# Patient Record
Sex: MALE | Race: WHITE | NOT HISPANIC OR LATINO | Employment: FULL TIME | URBAN - METROPOLITAN AREA
[De-identification: names, ages, dates, MRNs, and addresses within clinical notes are randomized per-mention and may not be internally consistent; named-entity substitution may affect disease eponyms.]

---

## 2019-03-16 ENCOUNTER — OFFICE VISIT (OUTPATIENT)
Dept: URGENT CARE | Facility: CLINIC | Age: 19
End: 2019-03-16
Payer: COMMERCIAL

## 2019-03-16 VITALS
OXYGEN SATURATION: 99 % | SYSTOLIC BLOOD PRESSURE: 126 MMHG | HEART RATE: 84 BPM | RESPIRATION RATE: 16 BRPM | DIASTOLIC BLOOD PRESSURE: 70 MMHG | TEMPERATURE: 98.5 F

## 2019-03-16 DIAGNOSIS — S39.011A STRAIN OF ABDOMINAL MUSCLE, INITIAL ENCOUNTER: ICD-10-CM

## 2019-03-16 PROCEDURE — 99203 OFFICE O/P NEW LOW 30 MIN: CPT | Performed by: NURSE PRACTITIONER

## 2019-03-16 ASSESSMENT — ENCOUNTER SYMPTOMS
CHILLS: 0
FEVER: 0
ABDOMINAL PAIN: 1

## 2019-03-16 NOTE — PROGRESS NOTES
Subjective:      Alvino Mtz is a 18 y.o. male who presents with Back Pain (lower back injury)    History reviewed. No pertinent past medical history.  Social History     Social History   • Marital status: Single     Spouse name: N/A   • Number of children: N/A   • Years of education: N/A     Occupational History   • Not on file.     Social History Main Topics   • Smoking status: Never Smoker   • Smokeless tobacco: Never Used   • Alcohol use No   • Drug use: No   • Sexual activity: Not on file     Other Topics Concern   • Not on file     Social History Narrative   • No narrative on file     History reviewed. No pertinent family history.    Allergies: Sulfa drugs    Patient is a 18-year-old male who presents today with complaint of pain to the right groin area.  Patient states last week he was working out as part of his training regimen for playing baseball.  He plays baseball in college.  Patient states he did not have immediate pain after working out, but that night after he went to sleep turned over on his side and had intense groin pain for a few seconds which then resolved.  He continued trying to play baseball through the week and 2 days ago after playing a game states he had groin pain again.  He was evaluated by the teams  who believed he has a muscle strain, however he did want the patient to be evaluated for possible hernia.  Patient states he is not having pain at this time.          Other   This is a new problem. The problem occurs constantly. The problem has been unchanged. Associated symptoms include abdominal pain. Pertinent negatives include no chills or fever. Nothing aggravates the symptoms. He has tried nothing for the symptoms. The treatment provided no relief.       Review of Systems   Constitutional: Negative for chills, fever and malaise/fatigue.   Gastrointestinal: Positive for abdominal pain.   All other systems reviewed and are negative.         Objective:     Pulse 84   Temp 36.9  °C (98.5 °F) (Temporal)   Resp 16   SpO2 99%      Physical Exam   Constitutional: He is oriented to person, place, and time. He appears well-nourished.   Cardiovascular: Normal rate and regular rhythm.    Pulmonary/Chest: Effort normal and breath sounds normal.   Genitourinary:         Genitourinary Comments: Area of tenderness that was sustained to the right groin area.  The area is nontender at this time.  No swelling noted.  Inguinal canal is nontender, and no obvious hernias palpable.  No pain or swelling noted with Valsalva maneuver.   Neurological: He is alert and oriented to person, place, and time.   Skin: Skin is warm and dry. Capillary refill takes less than 2 seconds.   Psychiatric: He has a normal mood and affect. His behavior is normal. Judgment and thought content normal.   Vitals reviewed.    Ultrasound ordered; patient refused at this time as he is not in acute pain.  States he will return to urgent care if pain returns.          Assessment/Plan:   Right groin strain    Monitor for worsening of symptoms  Return to urgent care immediately for increasing pain or presence of swelling or mass  Ibuprofen  Rest  ER precautions for sustained acute pain  There are no diagnoses linked to this encounter.

## 2022-10-03 ENCOUNTER — APPOINTMENT (OUTPATIENT)
Dept: URGENT CARE | Facility: CLINIC | Age: 22
End: 2022-10-03

## 2022-10-03 ENCOUNTER — OFFICE VISIT (OUTPATIENT)
Dept: URGENT CARE | Facility: CLINIC | Age: 22
End: 2022-10-03
Payer: COMMERCIAL

## 2022-10-03 VITALS
DIASTOLIC BLOOD PRESSURE: 86 MMHG | RESPIRATION RATE: 14 BRPM | SYSTOLIC BLOOD PRESSURE: 134 MMHG | HEIGHT: 71 IN | OXYGEN SATURATION: 98 % | HEART RATE: 79 BPM | WEIGHT: 180 LBS | TEMPERATURE: 97.3 F | BODY MASS INDEX: 25.2 KG/M2

## 2022-10-03 DIAGNOSIS — Q65.89 ACETABULAR RETROVERSION: ICD-10-CM

## 2022-10-03 DIAGNOSIS — R10.31 RIGHT INGUINAL PAIN: ICD-10-CM

## 2022-10-03 PROCEDURE — 99203 OFFICE O/P NEW LOW 30 MIN: CPT | Performed by: FAMILY MEDICINE

## 2022-10-03 NOTE — PROGRESS NOTES
"  Subjective:      22 y.o. male presents to urgent care for groin pain that started about 2.5 weeks ago.  He had been doing dead lifts when he noticed pain, it is intermittent, comes with rest, and improves with stretching, is described as both dull and achy, currently rated 2/10.  He has not yet tried anything for this pain.  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  He has not seen orthopedics since that diagnosis.  He denies any changes to urinary urgency, frequency, dysuria, or hematuria.  Bowel movements remain regular and soft.  No prior history of abdominal surgeries.    He denies any other questions or concerns at this time.    Current problem list, medication, and past medical/surgical history were reviewed in Epic.    ROS  See HPI     Objective:      /86   Pulse 79   Temp 36.3 °C (97.3 °F) (Temporal)   Resp 14   Ht 1.803 m (5' 11\")   Wt 81.6 kg (180 lb)   SpO2 98%   BMI 25.10 kg/m²     Physical Exam  Constitutional:       General: He is not in acute distress.     Appearance: He is not diaphoretic.   Cardiovascular:      Rate and Rhythm: Normal rate and regular rhythm.      Heart sounds: Normal heart sounds.   Pulmonary:      Effort: Pulmonary effort is normal. No respiratory distress.      Breath sounds: Normal breath sounds.   Abdominal:      General: Bowel sounds are normal.      Palpations: Abdomen is soft.      Tenderness: There is no abdominal tenderness. There is no right CVA tenderness or left CVA tenderness.      Hernia: There is no hernia in the left inguinal area or right inguinal area.   Neurological:      Mental Status: He is alert.   Psychiatric:         Mood and Affect: Affect normal.         Judgment: Judgment normal.     Assessment/Plan:     1. Right inguinal pain  2. Acetabular retroversion  Referral back to orthopedics has been placed.  In the meantime, patient was encouraged to continue with stretching, Tylenol, and ibuprofen as needed for " symptomatic relief.  - Referral to Orthopedics      Instructed to return to Urgent Care or nearest Emergency Department if symptoms fail to improve, for any change in condition, further concerns, or new concerning symptoms. Patient states understanding of the plan of care and discharge instructions.    Rosa Paredes M.D.

## 2022-10-13 ENCOUNTER — OFFICE VISIT (OUTPATIENT)
Dept: URGENT CARE | Facility: CLINIC | Age: 22
End: 2022-10-13
Payer: COMMERCIAL

## 2022-10-13 VITALS
SYSTOLIC BLOOD PRESSURE: 118 MMHG | DIASTOLIC BLOOD PRESSURE: 82 MMHG | HEIGHT: 71 IN | WEIGHT: 180 LBS | RESPIRATION RATE: 16 BRPM | BODY MASS INDEX: 25.2 KG/M2 | TEMPERATURE: 98.9 F | OXYGEN SATURATION: 99 % | HEART RATE: 92 BPM

## 2022-10-13 DIAGNOSIS — R10.31 RIGHT INGUINAL PAIN: ICD-10-CM

## 2022-10-13 DIAGNOSIS — K40.90 NON-RECURRENT UNILATERAL INGUINAL HERNIA WITHOUT OBSTRUCTION OR GANGRENE: ICD-10-CM

## 2022-10-13 LAB
APPEARANCE UR: CLEAR
BILIRUB UR STRIP-MCNC: NEGATIVE MG/DL
COLOR UR AUTO: YELLOW
GLUCOSE UR STRIP.AUTO-MCNC: NEGATIVE MG/DL
KETONES UR STRIP.AUTO-MCNC: NEGATIVE MG/DL
LEUKOCYTE ESTERASE UR QL STRIP.AUTO: NEGATIVE
NITRITE UR QL STRIP.AUTO: NEGATIVE
PH UR STRIP.AUTO: 7 [PH] (ref 5–8)
PROT UR QL STRIP: NEGATIVE MG/DL
RBC UR QL AUTO: NEGATIVE
SP GR UR STRIP.AUTO: 1.02
UROBILINOGEN UR STRIP-MCNC: 0.2 MG/DL

## 2022-10-13 PROCEDURE — 81002 URINALYSIS NONAUTO W/O SCOPE: CPT | Performed by: PHYSICIAN ASSISTANT

## 2022-10-13 PROCEDURE — 99214 OFFICE O/P EST MOD 30 MIN: CPT | Performed by: PHYSICIAN ASSISTANT

## 2022-10-13 ASSESSMENT — ENCOUNTER SYMPTOMS
VOMITING: 0
ABDOMINAL PAIN: 1
NAUSEA: 0
FLANK PAIN: 0
CHILLS: 0
FEVER: 0

## 2022-10-13 NOTE — PROGRESS NOTES
"  Subjective:   Alvino Mtz is a 22 y.o. male who presents today with   Chief Complaint   Patient presents with    RLQ Pain     \"Still having a burning/ bubbling pain in my RLQ. I twisted to the side last night, and got a sharp pain in my side. On Monday I rated the pain 2/10. But now the pain is progressing to 3 or 4/10.\"     RLQ Pain  This is a new problem. The current episode started 1 to 4 weeks ago. The onset quality is gradual. The problem occurs constantly. The problem has been gradually worsening. The pain is located in the RLQ. The quality of the pain is burning and aching. Pertinent negatives include no dysuria, fever, frequency, hematuria, nausea or vomiting.   No recent injury or trauma.   Patient has history of acetabular/hip pain and was referred to orthopedic specialist at his last visit.  He states last night he noticed some pain in the right lower abdomen.  He has been doing his stretches for the hip and thought he noticed that the pain was a little bit different than what he was having from the hip but he states it is hard to differentiate.  He states that the pain does radiate towards the right testicle occasionally.  He states he has no concerns for STDs.    PMH:  has no past medical history of Kidney disease.  MEDS: No current outpatient medications on file.  ALLERGIES:   Allergies   Allergen Reactions    Sulfa Drugs Rash     SURGHX: No past surgical history on file.  SOCHX:  reports that he has never smoked. He has never used smokeless tobacco. He reports that he does not drink alcohol and does not use drugs.  FH: Reviewed with patient, not pertinent to this visit.     Review of Systems   Constitutional:  Negative for chills and fever.   Gastrointestinal:  Positive for abdominal pain. Negative for nausea and vomiting.   Genitourinary:  Negative for dysuria, flank pain, frequency, hematuria and urgency.      Objective:   /82 (BP Location: Left arm, Patient Position: Sitting, BP Cuff " "Size: Adult)   Pulse 92   Temp 37.2 °C (98.9 °F) (Temporal)   Resp 16   Ht 1.803 m (5' 11\")   Wt 81.6 kg (180 lb)   SpO2 99%   BMI 25.10 kg/m²   Physical Exam  Vitals and nursing note reviewed.   Constitutional:       General: He is not in acute distress.     Appearance: Normal appearance. He is well-developed. He is not ill-appearing or toxic-appearing.   HENT:      Head: Normocephalic and atraumatic.      Right Ear: Hearing normal.      Left Ear: Hearing normal.   Cardiovascular:      Rate and Rhythm: Normal rate and regular rhythm.      Heart sounds: Normal heart sounds.   Pulmonary:      Effort: Pulmonary effort is normal.   Abdominal:      Hernia: No hernia is present.   Genitourinary:     Penis: Normal and circumcised.       Testes: Normal.   Musculoskeletal:      Comments: Normal movement in all 4 extremities   Skin:     General: Skin is warm and dry.   Neurological:      Mental Status: He is alert.      Coordination: Coordination normal.   Psychiatric:         Mood and Affect: Mood normal.   UA negative    US INGUINAL    Assessment/Plan:   Assessment    1. Right inguinal pain  - US-INGUINAL HERNIA; Future  Symptoms and presentation are consistent with right inguinal pain and would recommend ruling out hernia with ultrasound at this time and patient is agreeable with this.  No acute findings that would be suggestive of appendicitis at this time.    Differential diagnosis, natural history, supportive care, and indications for immediate follow-up discussed.   Patient given instructions and understanding of medications and treatment.    If not improving in 3-5 days, F/U with PCP or return to UC if symptoms worsen.    Patient agreeable to plan.    Please note that this dictation was created using voice recognition software. I have made every reasonable attempt to correct obvious errors, but I expect that there are errors of grammar and possibly content that I did not discover before finalizing the " note.    Giovanny Yeager PA-C

## 2022-10-14 ENCOUNTER — HOSPITAL ENCOUNTER (OUTPATIENT)
Dept: RADIOLOGY | Facility: MEDICAL CENTER | Age: 22
End: 2022-10-14
Attending: PHYSICIAN ASSISTANT
Payer: COMMERCIAL

## 2022-10-14 DIAGNOSIS — R10.31 RIGHT INGUINAL PAIN: ICD-10-CM

## 2022-10-14 PROCEDURE — 76857 US EXAM PELVIC LIMITED: CPT
